# Patient Record
Sex: FEMALE | Race: WHITE
[De-identification: names, ages, dates, MRNs, and addresses within clinical notes are randomized per-mention and may not be internally consistent; named-entity substitution may affect disease eponyms.]

---

## 2019-09-07 ENCOUNTER — HOSPITAL ENCOUNTER (EMERGENCY)
Dept: HOSPITAL 9 - MADERS | Age: 54
Discharge: HOME | End: 2019-09-07
Payer: COMMERCIAL

## 2019-09-07 DIAGNOSIS — N39.0: Primary | ICD-10-CM

## 2019-09-07 DIAGNOSIS — Z79.899: ICD-10-CM

## 2019-09-07 LAB
BACTERIA UR QL AUTO: (no result) HPF
PROT UR STRIP.AUTO-MCNC: (no result) MG/DL
RBC UR QL AUTO: (no result) HPF (ref 0–3)
WBC UR QL AUTO: (no result) HPF (ref 0–3)

## 2019-09-07 PROCEDURE — 81003 URINALYSIS AUTO W/O SCOPE: CPT

## 2019-09-07 PROCEDURE — 99283 EMERGENCY DEPT VISIT LOW MDM: CPT

## 2019-09-07 PROCEDURE — 81015 MICROSCOPIC EXAM OF URINE: CPT

## 2019-09-07 PROCEDURE — 87086 URINE CULTURE/COLONY COUNT: CPT

## 2019-09-07 PROCEDURE — 87186 SC STD MICRODIL/AGAR DIL: CPT

## 2019-09-07 PROCEDURE — 87077 CULTURE AEROBIC IDENTIFY: CPT

## 2020-05-26 ENCOUNTER — HOSPITAL ENCOUNTER (EMERGENCY)
Dept: HOSPITAL 9 - MADERS | Age: 55
Discharge: HOME | End: 2020-05-26
Payer: COMMERCIAL
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
## 2020-05-26 DIAGNOSIS — F90.9: ICD-10-CM

## 2020-05-26 DIAGNOSIS — Z85.43: ICD-10-CM

## 2020-05-26 DIAGNOSIS — S93.602A: Primary | ICD-10-CM

## 2020-05-26 DIAGNOSIS — Z87.442: ICD-10-CM

## 2020-05-26 DIAGNOSIS — Z79.899: ICD-10-CM

## 2020-05-26 NOTE — RAD
Exam:

XR Foot Lt 3 View STANDARD



HISTORY:

Left foot pain after a fall.



COMPARISON:

None



FINDINGS:

Suggested round lucency in the distal portion of the proximal phalanx left small toe only well seen o
n the AP projection and not well appreciated on the oblique view. This may be artifactual. If there

is point tenderness in this region, follow-up imaging is advised.

No acute fracture, dislocation, or other acute osseous abnormality is identified.



IMPRESSION:

No acute osseous abnormality is identified.



Reported By: John Tejada 

Electronically Signed:  5/26/2020 3:56 PM